# Patient Record
Sex: FEMALE | Race: WHITE | NOT HISPANIC OR LATINO | Employment: STUDENT | URBAN - METROPOLITAN AREA
[De-identification: names, ages, dates, MRNs, and addresses within clinical notes are randomized per-mention and may not be internally consistent; named-entity substitution may affect disease eponyms.]

---

## 2017-04-28 ENCOUNTER — OFFICE VISIT (OUTPATIENT)
Dept: URGENT CARE | Facility: CLINIC | Age: 32
End: 2017-04-28
Payer: COMMERCIAL

## 2017-04-28 VITALS
OXYGEN SATURATION: 100 % | HEART RATE: 112 BPM | DIASTOLIC BLOOD PRESSURE: 70 MMHG | SYSTOLIC BLOOD PRESSURE: 110 MMHG | BODY MASS INDEX: 22.84 KG/M2 | RESPIRATION RATE: 18 BRPM | HEIGHT: 69 IN | WEIGHT: 154.2 LBS | TEMPERATURE: 98.4 F

## 2017-04-28 DIAGNOSIS — G47.00 INSOMNIA, UNSPECIFIED TYPE: ICD-10-CM

## 2017-04-28 PROCEDURE — 99214 OFFICE O/P EST MOD 30 MIN: CPT | Performed by: PHYSICIAN ASSISTANT

## 2017-04-28 RX ORDER — HYDROXYZINE 50 MG/1
50 TABLET, FILM COATED ORAL 3 TIMES DAILY PRN
Qty: 30 TAB | Refills: 1 | Status: SHIPPED | OUTPATIENT
Start: 2017-04-28 | End: 2017-07-13

## 2017-04-28 ASSESSMENT — ENCOUNTER SYMPTOMS
CHILLS: 0
ABDOMINAL PAIN: 0
DIARRHEA: 0
DIZZINESS: 0
FEVER: 0
MUSCULOSKELETAL NEGATIVE: 1
NERVOUS/ANXIOUS: 1
SHORTNESS OF BREATH: 0
HEADACHES: 0
VOMITING: 0
NAUSEA: 0
INSOMNIA: 1

## 2017-04-28 ASSESSMENT — LIFESTYLE VARIABLES: SUBSTANCE_ABUSE: 0

## 2017-04-28 NOTE — MR AVS SNAPSHOT
"        Montserrat Cunningham   2017 12:00 PM   Office Visit   MRN: [id number]    Department:  [facility]   Dept Phone:  [phone]    Description:  Female : 1985   Provider:  Larisa Slater PA-C           Reason for Visit     Panic Attack x 1.5 weeks has been having panic attacks and irrational thoughts at night that are giving her insomnia       Allergies as of 2017     Allergen Noted Reactions    Pcn [Penicillins] 2016         You were diagnosed with     Insomnia, unspecified type   [[id number]]         Vital Signs     Blood Pressure Pulse Temperature Respirations Height Weight    110/70 mmHg 112 36.9 °C (98.4 °F) 18 1.753 m (5' 9\") 69.945 kg (154 lb 3.2 oz)    Body Mass Index Oxygen Saturation                22.76 kg/m2 100%          Basic Information     Date Of Birth Sex Race Ethnicity Preferred Language    1985 Female White Non- English      Health Maintenance        Date Due Completion Dates    IMM DTaP/Tdap/Td Vaccine (1 - Tdap) 2006 ---    PAP SMEAR 2008 ---            Current Immunizations     No immunizations on file.      Below and/or attached are the medications your provider expects you to take. Review all of your home medications and newly ordered medications with your provider and/or pharmacist. Follow medication instructions as directed by your provider and/or pharmacist. Please keep your medication list with you and share with your provider. Update the information when medications are discontinued, doses are changed, or new medications (including over-the-counter products) are added; and carry medication information at all times in the event of emergency situations     Allergies:  PCN - (reactions not documented)               Medications  Valid as of: 2017 -  1:41 PM    Generic Name Brand Name Tablet Size Instructions for use    HydrOXYzine HCl (Tab) ATARAX 50 MG Take 1 Tab by mouth 3 times a day as needed for Itching.       " Meloxicam (Tab) MOBIC 15 MG Take 1 Tab by mouth every day. as needed for pain        Metaxalone (Tab) SKELAXIN 800 MG Take 1 Tab by mouth 3 times a day as needed for Moderate Pain or Muscle Spasms.        TraMADol HCl (Tab) ULTRAM 50 MG Take 1-2 Tabs by mouth every 6 hours as needed.        Zolpidem Tartrate (Tab) AMBIEN 5 MG Take 5 mg by mouth at bedtime as needed for Sleep.        .                 Medicines prescribed today were sent to:     [id number] - [place] - [address] [place] [address]    Phone: [phone] Fax: [phone]    Open 24 Hours?: No      Medication refill instructions:       If your prescription bottle indicates you have medication refills left, it is not necessary to call your provider’s office. Please contact your pharmacy and they will refill your medication.    If your prescription bottle indicates you do not have any refills left, you may request refills at any time through one of the following ways: The online eBoox system (except Urgent Care), by calling your provider’s office, or by asking your pharmacy to contact your provider’s office with a refill request. Medication refills are processed only during regular business hours and may not be available until the next business day. Your provider may request additional information or to have a follow-up visit with you prior to refilling your medication.   *Please Note: Medication refills are assigned a new Rx number when refilled electronically. Your pharmacy may indicate that no refills were authorized even though a new prescription for the same medication is available at the pharmacy. Please request the medicine by name with the pharmacy before contacting your provider for a refill.           eBoox Access Code: [email or web address]  Expires: 5/28/2017  1:41 PM    eBoox  A secure, online tool to manage your health information     [employer]’s eBoox® is a secure, online tool that connects you to your  personalized health information from the privacy of your home -- day or night - making it very easy for you to manage your healthcare. Once the activation process is completed, you can even access your medical information using the Lanyon carlos, which is available for free in the Apple Carlos store or Google Play store.     Lanyon provides the following levels of access (as shown below):   My Chart Features   [facility] Doctor [facility]  Specialists [facility]  Urgent  Care Non-[facility]  Primary Care  Doctor   Email your healthcare team securely and privately 24/7 X X X    Manage appointments: schedule your next appointment; view details of past/upcoming appointments X      Request prescription refills. X      View recent personal medical records, including lab and immunizations X X X X   View health record, including health history, allergies, medications X X X X   Read reports about your outpatient visits, procedures, consult and ER notes X X X X   See your discharge summary, which is a recap of your hospital and/or ER visit that includes your diagnosis, lab results, and care plan. X X       How to register for Lanyon:  1. Go to  [email or web address].  2. Click on the Sign Up Now box, which takes you to the New Member Sign Up page. You will need to provide the following information:  a. Enter your Lanyon Access Code exactly as it appears at the top of this page. (You will not need to use this code after you’ve completed the sign-up process. If you do not sign up before the expiration date, you must request a new code.)   b. Enter your date of birth.   c. Enter your home email address.   d. Click Submit, and follow the next screen’s instructions.  3. Create a Lanyon ID. This will be your Lanyon login ID and cannot be changed, so think of one that is secure and easy to remember.  4. Create a Lanyon password. You can change your password at any time.  5. Enter your Password Reset Question and Answer. This can  be used at a later time if you forget your password.   6. Enter your e-mail address. This allows you to receive e-mail notifications when new information is available in Wallop.  7. Click Sign Up. You can now view your health information.    For assistance activating your Wallop account, call [phone]

## 2017-04-28 NOTE — PROGRESS NOTES
"Subjective:      Montserrat Cunningham is a 31 y.o. female who presents with Panic Attack            Panic Attack  Pertinent negatives include no abdominal pain, chest pain, chills, fever, headaches, nausea or vomiting.   Patient presents to urgent care reporting insomnia over the past 1.5 weeks. She states she has had difficulty with insomnia in the past, but it has been over a years since then. She was taking ambien as needed before bed for 6-9 months, before she stopped taking it. She does not have any problems falling asleep, but states she gets woken up very shortly after going sleep due to a nightmare of someone breaking into her house. She does not know what is triggering her nightmares, but states her mother recently told her that her college roommate knew Matias Ham, and she listened to a Matias Ham pod cast about a month ago. She is unable to fall back asleep after the nightmare due to the fear. She reports she has an appointment with a psychiatrist in 2 months to talk about her insomnia and irrational fears, but her lack of sleep is starting to interfere with her school work. She has not taken any OTC medications to help with her insomnia.     Review of Systems   Constitutional: Negative for fever and chills.   Respiratory: Negative for shortness of breath.    Cardiovascular: Negative for chest pain.   Gastrointestinal: Negative for nausea, vomiting, abdominal pain and diarrhea.   Genitourinary: Negative.    Musculoskeletal: Negative.    Neurological: Negative for dizziness and headaches.   Psychiatric/Behavioral: Negative for suicidal ideas and substance abuse. The patient is nervous/anxious and has insomnia.           Objective:     /70 mmHg  Pulse 112  Temp(Src) 36.9 °C (98.4 °F)  Resp 18  Ht 1.753 m (5' 9\")  Wt 69.945 kg (154 lb 3.2 oz)  BMI 22.76 kg/m2  SpO2 100%     Physical Exam   Constitutional: She is oriented to person, place, and time. She appears well-developed and well-nourished. No " distress.   HENT:   Head: Normocephalic and atraumatic.   Eyes: Conjunctivae are normal. Pupils are equal, round, and reactive to light.   Neck: Normal range of motion.   Cardiovascular: Regular rhythm and normal heart sounds.    No murmur heard.  Tachycardic   Pulmonary/Chest: Effort normal and breath sounds normal. No respiratory distress. She has no wheezes. She has no rales.   Musculoskeletal: Normal range of motion.   Neurological: She is alert and oriented to person, place, and time.   Skin: Skin is warm and dry. She is not diaphoretic.   Psychiatric: Her speech is normal and behavior is normal. Thought content normal. Her mood appears anxious. She does not exhibit a depressed mood. She is attentive.   Nursing note and vitals reviewed.              PMH:  has no past medical history on file.  MEDS:   Current outpatient prescriptions:   •  hydrOXYzine (ATARAX) 50 MG Tab, Take 1 Tab by mouth 3 times a day as needed for Itching., Disp: 30 Tab, Rfl: 1  •  zolpidem (AMBIEN) 5 MG Tab, Take 5 mg by mouth at bedtime as needed for Sleep., Disp: , Rfl:   •  meloxicam (MOBIC) 15 MG tablet, Take 1 Tab by mouth every day. as needed for pain, Disp: 20 Tab, Rfl: 0  •  tramadol (ULTRAM) 50 MG Tab, Take 1-2 Tabs by mouth every 6 hours as needed., Disp: 24 Tab, Rfl: 0  •  metaxalone (SKELAXIN) 800 MG Tab, Take 1 Tab by mouth 3 times a day as needed for Moderate Pain or Muscle Spasms., Disp: 15 Tab, Rfl: 1  ALLERGIES:   Allergies   Allergen Reactions   • Pcn [Penicillins]      SURGHX: History reviewed. No pertinent past surgical history.  SOCHX:    FH: family history is not on file.    Assessment/Plan:     1. Insomnia, unspecified type  - hydrOXYzine (ATARAX) 50 MG Tab; Take 1 Tab by mouth 3 times a day as needed for Itching.  Dispense: 30 Tab; Refill: 1   - Will cause sedation, avoid driving, operating heavy machinery, and drinking alcohol   - Patient has never taken Atarax before, I discussed with her that I would like her to  try this instead of ambien.       Discussed deep breathing techniques, encouraged meditation, good sleep hygiene, and avoidance of caffeinated drinks. She will follow up with a psychiatrist at her scheduled appointment in June. The patient demonstrated a good understanding and agreed with the treatment plan.

## 2017-07-13 ENCOUNTER — OFFICE VISIT (OUTPATIENT)
Dept: MEDICAL GROUP | Facility: MEDICAL CENTER | Age: 32
End: 2017-07-13
Payer: COMMERCIAL

## 2017-07-13 VITALS
OXYGEN SATURATION: 98 % | RESPIRATION RATE: 20 BRPM | HEART RATE: 90 BPM | SYSTOLIC BLOOD PRESSURE: 110 MMHG | BODY MASS INDEX: 24.14 KG/M2 | DIASTOLIC BLOOD PRESSURE: 70 MMHG | TEMPERATURE: 97.6 F | WEIGHT: 163 LBS | HEIGHT: 69 IN

## 2017-07-13 DIAGNOSIS — Z13.6 SCREENING FOR CARDIOVASCULAR CONDITION: ICD-10-CM

## 2017-07-13 DIAGNOSIS — R63.5 WEIGHT GAIN: ICD-10-CM

## 2017-07-13 DIAGNOSIS — Z00.00 WELLNESS EXAMINATION: ICD-10-CM

## 2017-07-13 PROCEDURE — 99213 OFFICE O/P EST LOW 20 MIN: CPT | Performed by: PHYSICIAN ASSISTANT

## 2017-07-13 ASSESSMENT — PATIENT HEALTH QUESTIONNAIRE - PHQ9: CLINICAL INTERPRETATION OF PHQ2 SCORE: 0

## 2017-07-13 NOTE — ASSESSMENT & PLAN NOTE
2 years off and on, worse for 6 months. Associated with mood swings, hair loss, swelling especially abdominal. More acne than usual cheeks. No hot cold intolerance. Having slightly irregular periods but not skipping. No chance of pregnancy. On no hormones. Had mirena IUD taken out 6 months ago.

## 2017-07-13 NOTE — Clinical Note
[facility]  Belgica Tolentino PA-C  [address]  [place] [address]  Fax: [phone]   Authorization for Release/Disclosure of   Protected Health Information   Name: MONTSERRAT CUNNINGHAM : 1985 SSN: [id number]   Address: [address] [address] [place] [address] Phone:    [phone] (home)    I authorize the entity listed below to release/disclose the PHI below to:   [facility]/Belgica Tolentino PA-C and Belgica Tolentino PA-C   Provider or Entity Name:     Address   City, State, Zip   Phone:      Fax:     Reason for request: continuity of care   Information to be released:    [  ] LAST COLONOSCOPY,  including any PATH REPORT and follow-up  [  ] LAST FIT/COLOGUARD RESULT [  ] LAST DEXA  [  ] LAST MAMMOGRAM  [  ] LAST PAP  [  ] LAST LABS [  ] RETINA EXAM REPORT  [  ] IMMUNIZATION RECORDS  [  ] Release all info      [  ] Check here and initial the line next to each item to release ALL health information INCLUDING  _____ Care and treatment for drug and / or alcohol abuse  _____ HIV testing, infection status, or AIDS  _____ Genetic Testing    DATES OF SERVICE OR TIME PERIOD TO BE DISCLOSED: _____________  I understand and acknowledge that:  * This Authorization may be revoked at any time by you in writing, except if your health information has already been used or disclosed.  * Your health information that will be used or disclosed as a result of you signing this authorization could be re-disclosed by the recipient. If this occurs, your re-disclosed health information may no longer be protected by State or Federal laws.  * You may refuse to sign this Authorization. Your refusal will not affect your ability to obtain treatment.  * This Authorization becomes effective upon signing and will  on (date) __________.      If no date is indicated, this Authorization will  one (1) year from the signature date.    Name: Montserrat Cunningham    Signature:   Date:     2017       PLEASE FAX  REQUESTED RECORDS BACK TO: [phone]

## 2017-07-13 NOTE — PROGRESS NOTES
Chief Complaint   Patient presents with   • New Patient     patient states she is here to establish care       HISTORY OF THE PRESENT ILLNESS: This is a 31 y.o. female new patient to our practice. This pleasant patient is here today to establish care and discuss a concern    Weight gain  2 years off and on, worse for 6 months. Associated with mood swings, hair loss, swelling especially abdominal. More acne than usual cheeks. No hot cold intolerance. Having slightly irregular periods but not skipping. No chance of pregnancy. On no hormones. Had mirena IUD taken out 6 months ago.      History reviewed. No pertinent past medical history.no heart or lung disease. No diabetes or immune disorder    History reviewed. No pertinent past surgical history.no hx of surgery    No family status information on file.   History reviewed. No pertinent family history.    Social History   Substance Use Topics   • Smoking status: Never Smoker    • Smokeless tobacco: Never Used   • Alcohol Use: No       Allergies: Pcn    No current Epic-ordered outpatient prescriptions on file.     No current Epic-ordered facility-administered medications on file.       Review of Systems   Constitutional: Negative for fever, chills,and malaise/fatigue.   HENT: Negative for ear pain, nosebleeds, congestion, sore throat and neck pain.    Eyes: Negative for blurred vision.   Respiratory: Negative for cough, sputum production, shortness of breath and wheezing.    Cardiovascular: Negative for chest pain, palpitations, orthopnea and leg swelling.   Gastrointestinal: Negative for heartburn, nausea, vomiting and abdominal pain.   Genitourinary: Negative for dysuria, urgency and frequency.   Musculoskeletal: Negative for myalgias, back pain and joint pain.   Skin: Negative for rash and itching.   Neurological: Negative for dizziness, tingling, tremors, sensory change, focal weakness and headaches.   Endo/Heme/Allergies: Does not bruise/bleed easily.  "  Psychiatric/Behavioral: Negative for depression, anxiety, or memory loss.   Did have an episode of anxiety earlier this year but saw a psychologist and is doing well now.  All other systems reviewed and are negative except as in HPI.    Exam: Blood pressure 110/70, pulse 90, temperature 36.4 °C (97.6 °F), resp. rate 20, height 1.753 m (5' 9.02\"), weight 73.936 kg (163 lb), last menstrual period 06/23/2017, SpO2 98 %, not currently breastfeeding.  General: Normal appearing. No distress.  Neck: Supple without JVD or bruit. Thyroid is not enlarged.  Pulmonary: Clear to ausculation.  Normal effort. No rales, ronchi, or wheezing.  Cardiovascular: Regular rate and rhythm without murmur. Carotid and radial pulses are intact and equal bilaterally.  Neurologic: Grossly nonfocal  Skin: Warm and dry.  No obvious lesions.  Musculoskeletal: Normal gait. No extremity cyanosis, clubbing, or edema.  Psych: Normal mood and affect. Alert and oriented x3. Judgment and insight is normal.    Assessment/Plan  1. Weight gain  TSH WITH REFLEX TO FT4    FSH+LH+DHEA S+PROG_E1+E2   2. Wellness examination  CBC WITH DIFFERENTIAL    COMP METABOLIC PANEL    HEMOGLOBIN A1C    VITAMIN D,25 HYDROXY    FSH+LH+DHEA S+PROG_E1+E2   3. Screening for cardiovascular condition  LIPID PROFILE     Will contact patient with lab results  "

## 2017-07-17 ENCOUNTER — HOSPITAL ENCOUNTER (OUTPATIENT)
Dept: LAB | Facility: MEDICAL CENTER | Age: 32
End: 2017-07-17
Attending: PHYSICIAN ASSISTANT
Payer: COMMERCIAL

## 2017-07-17 DIAGNOSIS — Z00.00 WELLNESS EXAMINATION: ICD-10-CM

## 2017-07-17 DIAGNOSIS — Z13.6 SCREENING FOR CARDIOVASCULAR CONDITION: ICD-10-CM

## 2017-07-17 DIAGNOSIS — R63.5 WEIGHT GAIN: ICD-10-CM

## 2017-07-17 LAB
25(OH)D3 SERPL-MCNC: 55 NG/ML (ref 30–100)
ALBUMIN SERPL BCP-MCNC: 4.3 G/DL (ref 3.2–4.9)
ALBUMIN/GLOB SERPL: 1.7 G/DL
ALP SERPL-CCNC: 46 U/L (ref 30–99)
ALT SERPL-CCNC: 20 U/L (ref 2–50)
ANION GAP SERPL CALC-SCNC: 7 MMOL/L (ref 0–11.9)
AST SERPL-CCNC: 18 U/L (ref 12–45)
BASOPHILS # BLD AUTO: 0.9 % (ref 0–1.8)
BASOPHILS # BLD: 0.04 K/UL (ref 0–0.12)
BILIRUB SERPL-MCNC: 0.6 MG/DL (ref 0.1–1.5)
BUN SERPL-MCNC: 9 MG/DL (ref 8–22)
CALCIUM SERPL-MCNC: 9.6 MG/DL (ref 8.5–10.5)
CHLORIDE SERPL-SCNC: 107 MMOL/L (ref 96–112)
CHOLEST SERPL-MCNC: 145 MG/DL (ref 100–199)
CO2 SERPL-SCNC: 22 MMOL/L (ref 20–33)
CREAT SERPL-MCNC: 0.7 MG/DL (ref 0.5–1.4)
DHEA-S SERPL-MCNC: 133.9 UG/DL (ref 98.8–340)
EOSINOPHIL # BLD AUTO: 0.13 K/UL (ref 0–0.51)
EOSINOPHIL NFR BLD: 3 % (ref 0–6.9)
ERYTHROCYTE [DISTWIDTH] IN BLOOD BY AUTOMATED COUNT: 42 FL (ref 35.9–50)
EST. AVERAGE GLUCOSE BLD GHB EST-MCNC: 100 MG/DL
ESTRADIOL SERPL-MCNC: 61 PG/ML
FSH SERPL-ACNC: 1.5 MIU/ML
GFR SERPL CREATININE-BSD FRML MDRD: >60 ML/MIN/1.73 M 2
GLOBULIN SER CALC-MCNC: 2.5 G/DL (ref 1.9–3.5)
GLUCOSE SERPL-MCNC: 81 MG/DL (ref 65–99)
HBA1C MFR BLD: 5.1 % (ref 0–5.6)
HCT VFR BLD AUTO: 44.2 % (ref 37–47)
HDLC SERPL-MCNC: 65 MG/DL
HGB BLD-MCNC: 14.3 G/DL (ref 12–16)
IMM GRANULOCYTES # BLD AUTO: 0.01 K/UL (ref 0–0.11)
IMM GRANULOCYTES NFR BLD AUTO: 0.2 % (ref 0–0.9)
LDLC SERPL CALC-MCNC: 66 MG/DL
LH SERPL-ACNC: 2 IU/L
LYMPHOCYTES # BLD AUTO: 1.35 K/UL (ref 1–4.8)
LYMPHOCYTES NFR BLD: 31.4 % (ref 22–41)
MCH RBC QN AUTO: 29.2 PG (ref 27–33)
MCHC RBC AUTO-ENTMCNC: 32.4 G/DL (ref 33.6–35)
MCV RBC AUTO: 90.4 FL (ref 81.4–97.8)
MONOCYTES # BLD AUTO: 0.31 K/UL (ref 0–0.85)
MONOCYTES NFR BLD AUTO: 7.2 % (ref 0–13.4)
NEUTROPHILS # BLD AUTO: 2.46 K/UL (ref 2–7.15)
NEUTROPHILS NFR BLD: 57.3 % (ref 44–72)
NRBC # BLD AUTO: 0 K/UL
NRBC BLD AUTO-RTO: 0 /100 WBC
PLATELET # BLD AUTO: 364 K/UL (ref 164–446)
PMV BLD AUTO: 10.4 FL (ref 9–12.9)
POTASSIUM SERPL-SCNC: 3.9 MMOL/L (ref 3.6–5.5)
PROGEST SERPL-MCNC: 2.24 NG/ML
PROT SERPL-MCNC: 6.8 G/DL (ref 6–8.2)
RBC # BLD AUTO: 4.89 M/UL (ref 4.2–5.4)
SODIUM SERPL-SCNC: 136 MMOL/L (ref 135–145)
TRIGL SERPL-MCNC: 72 MG/DL (ref 0–149)
TSH SERPL DL<=0.005 MIU/L-ACNC: 2.41 UIU/ML (ref 0.3–3.7)
WBC # BLD AUTO: 4.3 K/UL (ref 4.8–10.8)

## 2017-07-17 PROCEDURE — 83002 ASSAY OF GONADOTROPIN (LH): CPT

## 2017-07-17 PROCEDURE — 82627 DEHYDROEPIANDROSTERONE: CPT

## 2017-07-17 PROCEDURE — 36415 COLL VENOUS BLD VENIPUNCTURE: CPT

## 2017-07-17 PROCEDURE — 80061 LIPID PANEL: CPT

## 2017-07-17 PROCEDURE — 83001 ASSAY OF GONADOTROPIN (FSH): CPT

## 2017-07-17 PROCEDURE — 82306 VITAMIN D 25 HYDROXY: CPT

## 2017-07-17 PROCEDURE — 82679 ASSAY OF ESTRONE: CPT

## 2017-07-17 PROCEDURE — 84443 ASSAY THYROID STIM HORMONE: CPT

## 2017-07-17 PROCEDURE — 83036 HEMOGLOBIN GLYCOSYLATED A1C: CPT

## 2017-07-17 PROCEDURE — 85025 COMPLETE CBC W/AUTO DIFF WBC: CPT

## 2017-07-17 PROCEDURE — 82670 ASSAY OF TOTAL ESTRADIOL: CPT

## 2017-07-17 PROCEDURE — 84144 ASSAY OF PROGESTERONE: CPT

## 2017-07-17 PROCEDURE — 80053 COMPREHEN METABOLIC PANEL: CPT

## 2017-07-20 LAB — ESTRONE SERPL-MCNC: 29.8 PG/ML

## 2017-09-24 ENCOUNTER — OFFICE VISIT (OUTPATIENT)
Dept: URGENT CARE | Facility: CLINIC | Age: 32
End: 2017-09-24
Payer: COMMERCIAL

## 2017-09-24 VITALS
HEART RATE: 106 BPM | BODY MASS INDEX: 24.29 KG/M2 | HEIGHT: 69 IN | TEMPERATURE: 97.7 F | SYSTOLIC BLOOD PRESSURE: 118 MMHG | OXYGEN SATURATION: 98 % | WEIGHT: 164 LBS | DIASTOLIC BLOOD PRESSURE: 82 MMHG | RESPIRATION RATE: 20 BRPM

## 2017-09-24 DIAGNOSIS — J40 BRONCHITIS: ICD-10-CM

## 2017-09-24 PROCEDURE — 99214 OFFICE O/P EST MOD 30 MIN: CPT | Performed by: PHYSICIAN ASSISTANT

## 2017-09-24 RX ORDER — PROMETHAZINE HYDROCHLORIDE AND CODEINE PHOSPHATE 6.25; 1 MG/5ML; MG/5ML
5-10 SYRUP ORAL 3 TIMES DAILY PRN
Qty: 150 ML | Refills: 0 | Status: SHIPPED | OUTPATIENT
Start: 2017-09-24 | End: 2018-02-12

## 2017-09-24 RX ORDER — AZITHROMYCIN 500 MG/1
500 TABLET, FILM COATED ORAL DAILY
Qty: 10 TAB | Refills: 0 | Status: SHIPPED | OUTPATIENT
Start: 2017-09-24 | End: 2017-10-04

## 2017-09-24 ASSESSMENT — ENCOUNTER SYMPTOMS
CARDIOVASCULAR NEGATIVE: 1
COUGH: 1
SHORTNESS OF BREATH: 0
EYES NEGATIVE: 1
SORE THROAT: 0
SPUTUM PRODUCTION: 1
CONSTITUTIONAL NEGATIVE: 1

## 2017-09-24 NOTE — PROGRESS NOTES
"Subjective:      Montserrat Cunningham is a 31 y.o. female who presents with Cough (x3days, chest congestion, wet cough, chest pain, back pain)            Cough   This is a new problem. The current episode started in the past 7 days. The problem has been unchanged. The problem occurs every few minutes. The cough is productive of sputum. Pertinent negatives include no sore throat or shortness of breath. Nothing aggravates the symptoms. She has tried nothing for the symptoms. The treatment provided no relief. There is no history of asthma or environmental allergies.       Review of Systems   Constitutional: Negative.    HENT: Negative.  Negative for sore throat.    Eyes: Negative.    Respiratory: Positive for cough and sputum production. Negative for shortness of breath.    Cardiovascular: Negative.    Skin: Negative.    Endo/Heme/Allergies: Negative for environmental allergies.          Objective:     /82   Pulse (!) 106   Temp 36.5 °C (97.7 °F)   Resp 20   Ht 1.753 m (5' 9\")   Wt 74.4 kg (164 lb)   SpO2 98%   BMI 24.22 kg/m²      Physical Exam   Constitutional: She is oriented to person, place, and time. She appears well-developed and well-nourished. No distress.   HENT:   Head: Normocephalic and atraumatic.   Eyes: EOM are normal. Pupils are equal, round, and reactive to light.   Neck: Normal range of motion. Neck supple.   Cardiovascular: Normal rate.    Pulmonary/Chest: Effort normal and breath sounds normal. No respiratory distress. She has no wheezes. She has no rales.   Neurological: She is alert and oriented to person, place, and time.   Skin: Skin is warm and dry.   Psychiatric: She has a normal mood and affect. Her behavior is normal. Judgment and thought content normal.   Nursing note and vitals reviewed.    Vitals:    09/24/17 1157   BP: 118/82   Pulse: (!) 106   Resp: 20   Temp: 36.5 °C (97.7 °F)   SpO2: 98%   Weight: 74.4 kg (164 lb)   Height: 1.753 m (5' 9\")     Active Ambulatory Problems     " Diagnosis Date Noted   • Weight gain 07/13/2017     Resolved Ambulatory Problems     Diagnosis Date Noted   • No Resolved Ambulatory Problems     No Additional Past Medical History     No current outpatient prescriptions on file prior to visit.     No current facility-administered medications on file prior to visit.      Gargles, Cepacol lozenges, Aleve/Advil as needed for throat pain  History reviewed. No pertinent family history.  Pcn [penicillins]              Assessment/Plan:     ·  bronchitis      · Start w/MucinexD; nsaids; [rx abx prn sx persist/worsen]  ·

## 2018-02-12 ENCOUNTER — OFFICE VISIT (OUTPATIENT)
Dept: MEDICAL GROUP | Facility: MEDICAL CENTER | Age: 33
End: 2018-02-12
Payer: COMMERCIAL

## 2018-02-12 VITALS
RESPIRATION RATE: 16 BRPM | OXYGEN SATURATION: 95 % | HEART RATE: 105 BPM | HEIGHT: 69 IN | BODY MASS INDEX: 25.24 KG/M2 | SYSTOLIC BLOOD PRESSURE: 110 MMHG | WEIGHT: 170.4 LBS | DIASTOLIC BLOOD PRESSURE: 76 MMHG

## 2018-02-12 DIAGNOSIS — M25.50 ARTHRALGIA, UNSPECIFIED JOINT: ICD-10-CM

## 2018-02-12 DIAGNOSIS — R53.83 FATIGUE, UNSPECIFIED TYPE: ICD-10-CM

## 2018-02-12 DIAGNOSIS — M62.81 MUSCLE WEAKNESS: ICD-10-CM

## 2018-02-12 DIAGNOSIS — R21 MALAR RASH: ICD-10-CM

## 2018-02-12 PROCEDURE — 99213 OFFICE O/P EST LOW 20 MIN: CPT | Performed by: PHYSICIAN ASSISTANT

## 2018-02-12 RX ORDER — MAGNESIUM GLUCONATE 27 MG(500)
500 TABLET ORAL 3 TIMES DAILY
COMMUNITY
End: 2018-10-18

## 2018-02-12 RX ORDER — LANOLIN ALCOHOL/MO/W.PET/CERES
3 CREAM (GRAM) TOPICAL
COMMUNITY
End: 2018-10-18

## 2018-02-12 RX ORDER — POTASSIUM CHLORIDE 1.5 G/1.58G
20 POWDER, FOR SOLUTION ORAL 2 TIMES DAILY
COMMUNITY
End: 2018-10-18

## 2018-02-12 NOTE — PROGRESS NOTES
"Subjective:   Montserrat Cunningham is a 32 y.o. female here today for concern for symptoms    Joint pain  About 2-3 years. Went to a doctor because of hair loss and muscle weakness and joint pain. Thought vitamin D deficiency. Took Vitamin D which helped but then symptoms came back. Also skin rashes/dry patches on legs and upper arms, face, scalp. Abnormal thirst, headaches. Symptoms come and go in waves. 2 weeks ago almost went to the hospital because she felt so sick. Joints include knees, hips, sometimes hands - will get red and swollen. Sometimes neck hurts. Family members have lupus and other autoimmune disorder.     CBC, TSH, CMP, HgA1C from prior labs reviewed with patient and wnl    Current medicines (including changes today)  Current Outpatient Prescriptions   Medication Sig Dispense Refill   • vitamin D (CHOLECALCIFEROL) 1000 UNIT Tab Take 1,000 Units by mouth every day.     • magnesium gluconate (MAG-G) 500 MG tablet Take 500 mg by mouth 3 times a day.     • potassium chloride (KLOR-CON) 20 MEQ Pack Take 20 mEq by mouth 2 times a day.     • melatonin 3 MG Tab Take 3 mg by mouth every bedtime.       No current facility-administered medications for this visit.      She  has no past medical history on file.    ROS    No sore throat, nasal congestion, ear pain. No chest pain, no shortness of breath, difficulty breathing. No n/v/d/c or abdominal pain. No urinary complaint.      Objective:     Blood pressure 110/76, pulse (!) 105, resp. rate 16, height 1.753 m (5' 9\"), weight 77.3 kg (170 lb 6.4 oz), last menstrual period 01/19/2018, SpO2 95 %. Body mass index is 25.16 kg/m².   Physical Exam:  Constitutional: WDWN, NAD  Skin: Warm, dry, good turgor, facial erythema - cheeks  Eye: Equal, round and reactive, conjunctiva clear, lids normal.  ENMT: Lips without lesions, good dentition, oropharynx clear.  Neck: Trachea midline, no masses, no thyromegaly. No cervical or supraclavicular lymphadenopathy  Respiratory: " Unlabored respiratory effort, lungs clear to auscultation, no wheezes, no ronchi.  Cardiovascular: Normal S1, S2, no murmur  Psych: Alert and oriented x3, normal affect and mood.        Assessment and Plan:   The following treatment plan was discussed    1. Arthralgia, unspecified joint    - ANTI-NUCLEAR ANTIBODY SERUM; Future  - ANTI-NEUTROPHIL CYTOPLASMIC AB W/RFLX; Future  - RHEUMATOID ARTHRITIS FACTOR; Future  - CRP HIGH SENSITIVE (CARDIAC); Future  - WESTERGREN SED RATE; Future  - CRYOGLOBULIN QL SERUM RFLX; Future    2. Muscle weakness    - ANTI-NUCLEAR ANTIBODY SERUM; Future  - ANTI-NEUTROPHIL CYTOPLASMIC AB W/RFLX; Future  - RHEUMATOID ARTHRITIS FACTOR; Future  - CRP HIGH SENSITIVE (CARDIAC); Future  - WESTERGREN SED RATE; Future  - CRYOGLOBULIN QL SERUM RFLX; Future    3. Malar rash    - ANTI-NUCLEAR ANTIBODY SERUM; Future  - ANTI-NEUTROPHIL CYTOPLASMIC AB W/RFLX; Future  - RHEUMATOID ARTHRITIS FACTOR; Future  - CRP HIGH SENSITIVE (CARDIAC); Future  - WESTERGREN SED RATE; Future  - CRYOGLOBULIN QL SERUM RFLX; Future    4. Fatigue, unspecified type    - ANTI-NUCLEAR ANTIBODY SERUM; Future  - ANTI-NEUTROPHIL CYTOPLASMIC AB W/RFLX; Future  - RHEUMATOID ARTHRITIS FACTOR; Future  - CRP HIGH SENSITIVE (CARDIAC); Future  - WESTERGREN SED RATE; Future  - CRYOGLOBULIN QL SERUM RFLX; Future      Followup: will f/u with lab results

## 2018-02-12 NOTE — ASSESSMENT & PLAN NOTE
About 2-3 years. Went to a doctor because of hair loss and muscle weakness and joint pain. Thought vitamin D deficiency. Took Vitamin D which helped but then symptoms came back. Also skin rashes/dry patches on legs and upper arms, face, scalp. Abnormal thirst, headaches. Symptoms come and go in waves. 2 weeks ago almost went to the hospital because she felt so sick. Joints include knees, hips, sometimes hands - will get red and swollen. Sometimes neck hurts. Family members have lupus and other autoimmune disorder.

## 2018-02-14 ENCOUNTER — HOSPITAL ENCOUNTER (OUTPATIENT)
Dept: LAB | Facility: MEDICAL CENTER | Age: 33
End: 2018-02-14
Attending: PHYSICIAN ASSISTANT
Payer: COMMERCIAL

## 2018-02-14 DIAGNOSIS — R21 MALAR RASH: ICD-10-CM

## 2018-02-14 DIAGNOSIS — M25.50 ARTHRALGIA, UNSPECIFIED JOINT: ICD-10-CM

## 2018-02-14 DIAGNOSIS — M62.81 MUSCLE WEAKNESS: ICD-10-CM

## 2018-02-14 DIAGNOSIS — R53.83 FATIGUE, UNSPECIFIED TYPE: ICD-10-CM

## 2018-02-14 LAB
CRP SERPL HS-MCNC: 0.7 MG/L (ref 0–7.5)
ERYTHROCYTE [SEDIMENTATION RATE] IN BLOOD BY WESTERGREN METHOD: 10 MM/HOUR (ref 0–20)
RHEUMATOID FACT SER IA-ACNC: <10 IU/ML (ref 0–14)

## 2018-02-14 PROCEDURE — 36415 COLL VENOUS BLD VENIPUNCTURE: CPT

## 2018-02-14 PROCEDURE — 86431 RHEUMATOID FACTOR QUANT: CPT

## 2018-02-14 PROCEDURE — 85652 RBC SED RATE AUTOMATED: CPT

## 2018-02-14 PROCEDURE — 86255 FLUORESCENT ANTIBODY SCREEN: CPT

## 2018-02-14 PROCEDURE — 86038 ANTINUCLEAR ANTIBODIES: CPT

## 2018-02-14 PROCEDURE — 86141 C-REACTIVE PROTEIN HS: CPT

## 2018-02-14 PROCEDURE — 82595 ASSAY OF CRYOGLOBULIN: CPT

## 2018-02-16 LAB — NUCLEAR IGG SER QL IA: NORMAL

## 2018-02-17 LAB — ANCA IGG TITR SER IF: NORMAL {TITER}

## 2018-02-20 LAB — CRYOGLOB SER QL 3D COLD INC: NORMAL

## 2018-10-18 ENCOUNTER — OFFICE VISIT (OUTPATIENT)
Dept: MEDICAL GROUP | Facility: MEDICAL CENTER | Age: 33
End: 2018-10-18
Payer: COMMERCIAL

## 2018-10-18 VITALS
HEIGHT: 69 IN | DIASTOLIC BLOOD PRESSURE: 70 MMHG | HEART RATE: 80 BPM | SYSTOLIC BLOOD PRESSURE: 108 MMHG | WEIGHT: 155.2 LBS | OXYGEN SATURATION: 96 % | BODY MASS INDEX: 22.99 KG/M2

## 2018-10-18 DIAGNOSIS — F32.2 CURRENT SEVERE EPISODE OF MAJOR DEPRESSIVE DISORDER WITHOUT PSYCHOTIC FEATURES WITHOUT PRIOR EPISODE (HCC): ICD-10-CM

## 2018-10-18 PROBLEM — R63.5 WEIGHT GAIN: Status: RESOLVED | Noted: 2017-07-13 | Resolved: 2018-10-18

## 2018-10-18 PROBLEM — M25.50 JOINT PAIN: Status: RESOLVED | Noted: 2018-02-12 | Resolved: 2018-10-18

## 2018-10-18 PROCEDURE — 99214 OFFICE O/P EST MOD 30 MIN: CPT | Performed by: PHYSICIAN ASSISTANT

## 2018-10-18 RX ORDER — SERTRALINE HYDROCHLORIDE 25 MG/1
25 TABLET, FILM COATED ORAL DAILY
Qty: 30 TAB | Refills: 3 | Status: SHIPPED | OUTPATIENT
Start: 2018-10-18 | End: 2018-11-01

## 2018-10-18 ASSESSMENT — PATIENT HEALTH QUESTIONNAIRE - PHQ9
CLINICAL INTERPRETATION OF PHQ2 SCORE: 6
SUM OF ALL RESPONSES TO PHQ QUESTIONS 1-9: 25
5. POOR APPETITE OR OVEREATING: 3 - NEARLY EVERY DAY

## 2018-10-18 NOTE — PROGRESS NOTES
Subjective:   Montserrat Cunningham is a 32 y.o. female here today for depression    Current severe episode of major depressive disorder without psychotic features without prior episode (HCC)  Patient presents to discuss ongoing and worsening severe depression. Seeing online therapist. Trouble finding a psychiatrist.   Some form of depression since 15. Therapy off and on as needed, usually is enough combined with diet and exercise. Medication prescribed in college for about a year - remembers that being helpful, gave her more motivation. Currently mild depression over the last year, much worse for 2 months - this is the worst it's been since college. Seeing this online therapist just recently. Dad deals with depression. Mom's side of family has depression/anxiety. Both sides of family have alcohol and drug issues. No hx of roque or hypomania. Trouble sleeping and sleeping to much. Eating too little at times or eating too much. No energy. Can't get energy to go to gym. Not going out with friends. Doesn't even feel like talking to friends. Lives currently with parents, helps take care of dad who has Parkinson's. In the last 5 years has had suicidal thoughts. Age 15 had suicidal thoughts. Feels safe at this time. Drinking more than normal. No drug use.      Depression Screening    Little interest or pleasure in doing things?  3 - nearly every day   Feeling down, depressed , or hopeless? 3 - nearly every day   Trouble falling or staying asleep, or sleeping too much?  3 - nearly every day   Feeling tired or having little energy?  3 - nearly every day   Poor appetite or overeating?  3 - nearly every day   Feeling bad about yourself - or that you are a failure or have let yourself or your family down? 3 - nearly every day   Trouble concentrating on things, such as reading the newspaper or watching television? 3 - nearly every day   Moving or speaking so slowly that other people could have noticed.  Or the opposite - being so  "fidgety or restless that you have been moving around a lot more than usual?  1 - several days   Thoughts that you would be better off dead, or of hurting yourself?  3 - nearly every day   Patient Health Questionnaire Score: 25       If depressive symptoms identified deferred to follow up visit unless specifically addressed in assesment and plan.    Interpretation of PHQ-9 Total Score   Score Severity   1-4 No Depression   5-9 Mild Depression   10-14 Moderate Depression   15-19 Moderately Severe Depression   20-27 Severe Depression      Current medicines (including changes today)  Current Outpatient Prescriptions   Medication Sig Dispense Refill   • sertraline (ZOLOFT) 25 MG tablet Take 1 Tab by mouth every day. 30 Tab 3     No current facility-administered medications for this visit.      She  has a past medical history of Depression.    ROS   No fever/chills. No weight change. No headache/dizziness. No focal weakness. No sore throat, nasal congestion, ear pain. No chest pain, no shortness of breath, difficulty breathing. No n/v/d/c or abdominal pain. No urinary complaint. No rash or skin lesion. No joint pain or swelling.     Objective:     Blood pressure 108/70, pulse 80, height 1.753 m (5' 9\"), weight 70.4 kg (155 lb 3.2 oz), SpO2 96 %, not currently breastfeeding. Body mass index is 22.92 kg/m².   Physical Exam:  Constitutional: WDWN, NAD  Skin: Warm, dry, good turgor, no rashes in visible areas.  Psych: Alert and oriented x3, tearful but normal insight and judgement    Assessment and Plan:   The following treatment plan was discussed    1. Current severe episode of major depressive disorder without psychotic features without prior episode (HCC)    - Patient has been identified as being depressed and appropriate orders and counseling have been given  - REFERRAL TO PSYCHIATRY  - REFERRAL TO PSYCHOLOGY  - sertraline (ZOLOFT) 25 MG tablet; Take 1 Tab by mouth every day.  Dispense: 30 Tab; Refill: 3  Risk/benefit " and potential side effects discussed. ER precautions given. Will tell parents she is starting medication and have them watch for changing or odd behavior    Followup: Return in about 2 weeks (around 11/1/2018).

## 2018-10-18 NOTE — ASSESSMENT & PLAN NOTE
Patient presents to discuss ongoing and worsening severe depression. Seeing online therapist. Trouble finding a psychiatrist.   Some form of depression since 15. Therapy off and on as needed, usually is enough combined with diet and exercise. Medication prescribed in college for about a year - remembers that being helpful, gave her more motivation. Currently mild depression over the last year, much worse for 2 months - this is the worst it's been since college. Seeing this online therapist just recently. Dad deals with depression. Mom's side of family has depression/anxiety. Both sides of family have alcohol and drug issues. No hx of roque or hypomania. Trouble sleeping and sleeping to much. Eating too little at times or eating too much. No energy. Can't get energy to go to gym. Not going out with friends. Doesn't even feel like talking to friends. Lives currently with parents, helps take care of dad who has Parkinson's. In the last 5 years has had suicidal thoughts. Age 15 had suicidal thoughts. Feels safe at this time. Drinking more than normal. No drug use.

## 2018-11-01 ENCOUNTER — OFFICE VISIT (OUTPATIENT)
Dept: MEDICAL GROUP | Facility: MEDICAL CENTER | Age: 33
End: 2018-11-01
Payer: COMMERCIAL

## 2018-11-01 VITALS
WEIGHT: 155.6 LBS | SYSTOLIC BLOOD PRESSURE: 112 MMHG | DIASTOLIC BLOOD PRESSURE: 70 MMHG | HEART RATE: 80 BPM | OXYGEN SATURATION: 100 % | BODY MASS INDEX: 23.05 KG/M2 | HEIGHT: 69 IN

## 2018-11-01 DIAGNOSIS — F32.2 CURRENT SEVERE EPISODE OF MAJOR DEPRESSIVE DISORDER WITHOUT PSYCHOTIC FEATURES WITHOUT PRIOR EPISODE (HCC): ICD-10-CM

## 2018-11-01 PROCEDURE — 99213 OFFICE O/P EST LOW 20 MIN: CPT | Performed by: PHYSICIAN ASSISTANT

## 2018-11-01 RX ORDER — SERTRALINE HYDROCHLORIDE 25 MG/1
25 TABLET, FILM COATED ORAL DAILY
Qty: 30 TAB | Refills: 3 | Status: SHIPPED | OUTPATIENT
Start: 2018-11-01 | End: 2018-11-01 | Stop reason: CLARIF

## 2018-11-01 NOTE — PROGRESS NOTES
"Subjective:   Montserrat Cunningham is a 32 y.o. female here today for follow up on depression    Current severe episode of major depressive disorder without psychotic features without prior episode (HCC)  Doing well. Had some slight vertigo and headache initially but better now. Feeling some improvement in depression symptoms. Hasn't scheduled with psychologist yet. Would like to increase the zoloft to 50mg daily.       Current medicines (including changes today)  Current Outpatient Prescriptions   Medication Sig Dispense Refill   • sertraline (ZOLOFT) 50 MG Tab Take 1 Tab by mouth every day. 30 Tab 3     No current facility-administered medications for this visit.      She  has a past medical history of Depression.    ROS   No fever/chills. No weight change. No headache/dizziness. No focal weakness. No sore throat, nasal congestion, ear pain. No chest pain, no shortness of breath, difficulty breathing. No n/v/d/c or abdominal pain. No urinary complaint. No rash or skin lesion. No joint pain or swelling.       Objective:     Blood pressure 112/70, pulse 80, height 1.753 m (5' 9\"), weight 70.6 kg (155 lb 9.6 oz), SpO2 100 %, not currently breastfeeding. Body mass index is 22.98 kg/m².   Physical Exam:  Constitutional: WDWN, NAD  Skin: Warm, dry, good turgor, no rashes in visible areas.  Psych: Alert and oriented x3, normal affect and mood.      Assessment and Plan:   The following treatment plan was discussed    1. Current severe episode of major depressive disorder without psychotic features without prior episode (HCC)    - sertraline (ZOLOFT) 50 MG Tab; Take 1 Tab by mouth every day.  Dispense: 30 Tab; Refill: 3      Followup: Return in about 4 weeks (around 11/29/2018).         "

## 2018-11-01 NOTE — ASSESSMENT & PLAN NOTE
Doing well. Had some slight vertigo and headache initially but better now. Feeling some improvement in depression symptoms. Hasn't scheduled with psychologist yet. Would like to increase the zoloft to 50mg daily.

## 2018-12-03 ENCOUNTER — OFFICE VISIT (OUTPATIENT)
Dept: MEDICAL GROUP | Facility: MEDICAL CENTER | Age: 33
End: 2018-12-03
Payer: COMMERCIAL

## 2018-12-03 VITALS
HEART RATE: 94 BPM | OXYGEN SATURATION: 95 % | SYSTOLIC BLOOD PRESSURE: 100 MMHG | BODY MASS INDEX: 23.43 KG/M2 | DIASTOLIC BLOOD PRESSURE: 70 MMHG | HEIGHT: 69 IN | WEIGHT: 158.2 LBS

## 2018-12-03 DIAGNOSIS — F32.2 CURRENT SEVERE EPISODE OF MAJOR DEPRESSIVE DISORDER WITHOUT PSYCHOTIC FEATURES WITHOUT PRIOR EPISODE (HCC): ICD-10-CM

## 2018-12-03 PROCEDURE — 99213 OFFICE O/P EST LOW 20 MIN: CPT | Performed by: PHYSICIAN ASSISTANT

## 2018-12-03 RX ORDER — VENLAFAXINE 75 MG/1
75 TABLET ORAL DAILY
Qty: 30 TAB | Refills: 3 | Status: SHIPPED | OUTPATIENT
Start: 2018-12-03 | End: 2019-01-09 | Stop reason: SDUPTHER

## 2018-12-03 NOTE — PROGRESS NOTES
"Subjective:   Montserrat Cunningham is a 33 y.o. female here today for follow up on depression and anxiety    Current severe episode of major depressive disorder without psychotic features without prior episode (HCC)  Doing well with the depression, still having anxiety, worried about possible weight gain on zoloft, would like to consider effexor       Current medicines (including changes today)  Current Outpatient Prescriptions   Medication Sig Dispense Refill   • venlafaxine (EFFEXOR) 75 MG Tab Take 1 Tab by mouth every day for 30 days. 30 Tab 3   • sertraline (ZOLOFT) 50 MG Tab Take 1 Tab by mouth every day. 30 Tab 3     No current facility-administered medications for this visit.      She  has a past medical history of Depression.    ROS   No fever/chills. No weight change. No headache/dizziness. No focal weakness. No sore throat, nasal congestion, ear pain. No chest pain, no shortness of breath, difficulty breathing. No n/v/d/c or abdominal pain. No urinary complaint. No rash or skin lesion. No joint pain or swelling.     Objective:     Blood pressure 100/70, pulse 94, height 1.753 m (5' 9\"), weight 71.8 kg (158 lb 3.2 oz), SpO2 95 %, not currently breastfeeding. Body mass index is 23.36 kg/m².   Physical Exam:  Constitutional: WDWN, NAD  Skin: Warm, dry, good turgor, no rashes in visible areas.  Psych: Alert and oriented x3, normal affect and mood.    Assessment and Plan:   The following treatment plan was discussed    1. Current severe episode of major depressive disorder without psychotic features without prior episode (HCC)  Discussed risk/benefit and potential side effects of medicine. [facility] and then [facility] one month. Will consider tapering off zoloft at that time.  - venlafaxine (EFFEXOR) 75 MG Tab; Take 1 Tab by mouth every day for 30 days.  Dispense: 30 Tab; Refill: 3  - sertraline (ZOLOFT) 50 MG Tab; Take 1 Tab by mouth every day.  Dispense: 30 Tab; Refill: 3      Followup: " Return in about 4 weeks (around 12/31/2018).

## 2018-12-03 NOTE — ASSESSMENT & PLAN NOTE
Doing well with the depression, still having anxiety, worried about possible weight gain on zoloft, would like to consider effexor

## 2019-01-09 ENCOUNTER — OFFICE VISIT (OUTPATIENT)
Dept: MEDICAL GROUP | Facility: MEDICAL CENTER | Age: 34
End: 2019-01-09
Payer: COMMERCIAL

## 2019-01-09 VITALS
HEIGHT: 69 IN | BODY MASS INDEX: 22.78 KG/M2 | SYSTOLIC BLOOD PRESSURE: 100 MMHG | OXYGEN SATURATION: 98 % | DIASTOLIC BLOOD PRESSURE: 66 MMHG | WEIGHT: 153.8 LBS | RESPIRATION RATE: 14 BRPM | HEART RATE: 86 BPM

## 2019-01-09 DIAGNOSIS — F32.2 CURRENT SEVERE EPISODE OF MAJOR DEPRESSIVE DISORDER WITHOUT PSYCHOTIC FEATURES WITHOUT PRIOR EPISODE (HCC): ICD-10-CM

## 2019-01-09 PROCEDURE — 99213 OFFICE O/P EST LOW 20 MIN: CPT | Performed by: PHYSICIAN ASSISTANT

## 2019-01-09 RX ORDER — VENLAFAXINE 75 MG/1
75 TABLET ORAL DAILY
Qty: 90 TAB | Refills: 3 | Status: SHIPPED | OUTPATIENT
Start: 2019-01-09 | End: 2019-04-09

## 2019-01-09 NOTE — PROGRESS NOTES
"Subjective:   Montserrat Cunningham is a 33 y.o. female here today for f/u on depression/anxiety    Current severe episode of major depressive disorder without psychotic features without prior episode (HCC)  Doing very well on current zoloft 50mg daily and effexor 75mg daily. Would like to continue current. Discussed recommendation to take at least one year then can consider taper if desired. Patient states understanding.     Going out with friends, going to the gym, feels back to her normal self.    Current medicines (including changes today)  Current Outpatient Prescriptions   Medication Sig Dispense Refill   • venlafaxine (EFFEXOR) 75 MG Tab Take 1 Tab by mouth every day for 90 days. 90 Tab 3   • sertraline (ZOLOFT) 50 MG Tab Take 1 Tab by mouth every day for 90 days. 90 Tab 3     No current facility-administered medications for this visit.      She  has a past medical history of Depression.    ROS   No fever/chills. No weight change. No headache/dizziness. No focal weakness. No sore throat, nasal congestion, ear pain. No chest pain, no shortness of breath, difficulty breathing. No n/v/d/c or abdominal pain. No urinary complaint. No rash or skin lesion. No joint pain or swelling.       Objective:     Blood pressure 100/66, pulse 86, resp. rate 14, height 1.753 m (5' 9\"), weight 69.8 kg (153 lb 12.8 oz), SpO2 98 %, not currently breastfeeding. Body mass index is 22.71 kg/m².   Physical Exam:  Constitutional: WDWN, NAD  Skin: Warm, dry, good turgor, no rashes in visible areas.  Psych: Alert and oriented x3, normal affect and mood.    Assessment and Plan:   The following treatment plan was discussed    1. Current severe episode of major depressive disorder without psychotic features without prior episode (HCC)    - venlafaxine (EFFEXOR) 75 MG Tab; Take 1 Tab by mouth every day for 90 days.  Dispense: 90 Tab; Refill: 3  - sertraline (ZOLOFT) 50 MG Tab; Take 1 Tab by mouth every day for 90 days.  Dispense: 90 Tab; " Refill: 3      Followup: Return if symptoms worsen or fail to improve.

## 2019-01-09 NOTE — ASSESSMENT & PLAN NOTE
Doing very well on current zoloft 50mg daily and effexor 75mg daily. Would like to continue current. Discussed recommendation to take at least one year then can consider taper if desired. Patient states understanding.

## 2019-07-08 ENCOUNTER — PATIENT MESSAGE (OUTPATIENT)
Dept: MEDICAL GROUP | Facility: MEDICAL CENTER | Age: 34
End: 2019-07-08

## 2019-07-08 DIAGNOSIS — F32.2 CURRENT SEVERE EPISODE OF MAJOR DEPRESSIVE DISORDER WITHOUT PSYCHOTIC FEATURES WITHOUT PRIOR EPISODE (HCC): ICD-10-CM

## 2019-07-11 ENCOUNTER — PATIENT MESSAGE (OUTPATIENT)
Dept: MEDICAL GROUP | Facility: MEDICAL CENTER | Age: 34
End: 2019-07-11

## 2019-07-11 DIAGNOSIS — F32.2 CURRENT SEVERE EPISODE OF MAJOR DEPRESSIVE DISORDER WITHOUT PSYCHOTIC FEATURES WITHOUT PRIOR EPISODE (HCC): ICD-10-CM

## 2019-07-11 NOTE — TELEPHONE ENCOUNTER
From: Montserrat Cunningham  To: Belgica Tolentino P.A.-C.  Sent: 7/11/2019 12:38 PM PDT  Subject: Prescription Question    Raoul Lindo,     It looks like the pharmacy didn't receive the prescription. Was it sent to the [facility] on [address]?  ----- Message -----  From: Belgiac Tolentino P.A.-C.  Sent: 7/8/2019 9:28 AM PDT  To: Montserrat Cunningham  Subject: RE: Prescription Question  Absolutely! I'll send it now. Thanks, Belgica Tolentino PA-C    ----- Message -----   From: Montserrat Cunningham   Sent: 7/8/2019 12:32 AM PDT   To: Belgica Tolentino P.A.-C.  Subject: Prescription Question    Raoul Lindo,     A few months ago I started taking 75MG of Sertraline instead of 50MG. I felt that I needed a tiny bit more and it's been helping tremendously. I want to stay at 75MG, if possible. I was trying to get a refill of my prescription because I only have 2 1/5 days left, but it looks like it's too soon for them to refill my last prescription since I've been taking an extra 1/5 pill. Is it possible to get a refill for 75MG at the [facility] on [address]? Please let me know.     Hope all is well!    Best,    Montserrat Cunningham

## 2019-12-01 DIAGNOSIS — F32.2 CURRENT SEVERE EPISODE OF MAJOR DEPRESSIVE DISORDER WITHOUT PSYCHOTIC FEATURES WITHOUT PRIOR EPISODE (HCC): ICD-10-CM
